# Patient Record
Sex: FEMALE | Race: BLACK OR AFRICAN AMERICAN
[De-identification: names, ages, dates, MRNs, and addresses within clinical notes are randomized per-mention and may not be internally consistent; named-entity substitution may affect disease eponyms.]

---

## 2017-10-25 ENCOUNTER — HOSPITAL ENCOUNTER (EMERGENCY)
Dept: HOSPITAL 62 - ER | Age: 37
Discharge: HOME | End: 2017-10-25
Payer: MEDICAID

## 2017-10-25 VITALS — SYSTOLIC BLOOD PRESSURE: 143 MMHG | DIASTOLIC BLOOD PRESSURE: 99 MMHG

## 2017-10-25 DIAGNOSIS — N30.00: Primary | ICD-10-CM

## 2017-10-25 DIAGNOSIS — R10.9: ICD-10-CM

## 2017-10-25 DIAGNOSIS — R42: ICD-10-CM

## 2017-10-25 DIAGNOSIS — R11.0: ICD-10-CM

## 2017-10-25 DIAGNOSIS — I10: ICD-10-CM

## 2017-10-25 DIAGNOSIS — R51: ICD-10-CM

## 2017-10-25 DIAGNOSIS — F17.210: ICD-10-CM

## 2017-10-25 LAB
ALBUMIN SERPL-MCNC: 3.5 G/DL (ref 3.5–5)
ALP SERPL-CCNC: 117 U/L (ref 38–126)
ALT SERPL-CCNC: 21 U/L (ref 9–52)
ANION GAP SERPL CALC-SCNC: 11 MMOL/L (ref 5–19)
APPEARANCE UR: (no result)
AST SERPL-CCNC: 16 U/L (ref 14–36)
BASOPHILS # BLD AUTO: 0.2 10^3/UL (ref 0–0.2)
BASOPHILS NFR BLD AUTO: 1.8 % (ref 0–2)
BILIRUB DIRECT SERPL-MCNC: 0.3 MG/DL (ref 0–0.4)
BILIRUB SERPL-MCNC: 0.5 MG/DL (ref 0.2–1.3)
BILIRUB UR QL STRIP: NEGATIVE
BUN SERPL-MCNC: 12 MG/DL (ref 7–20)
CALCIUM: 9 MG/DL (ref 8.4–10.2)
CHLORIDE SERPL-SCNC: 105 MMOL/L (ref 98–107)
CO2 SERPL-SCNC: 27 MMOL/L (ref 22–30)
CREAT SERPL-MCNC: 1 MG/DL (ref 0.52–1.25)
EOSINOPHIL # BLD AUTO: 0.4 10^3/UL (ref 0–0.6)
EOSINOPHIL NFR BLD AUTO: 4.1 % (ref 0–6)
ERYTHROCYTE [DISTWIDTH] IN BLOOD BY AUTOMATED COUNT: 13.8 % (ref 11.5–14)
GLUCOSE SERPL-MCNC: 95 MG/DL (ref 75–110)
GLUCOSE UR STRIP-MCNC: NEGATIVE MG/DL
HCT VFR BLD CALC: 34.3 % (ref 36–47)
HGB BLD-MCNC: 12 G/DL (ref 12–15.5)
HGB HCT DIFFERENCE: 1.7
KETONES UR STRIP-MCNC: NEGATIVE MG/DL
LIPASE SERPL-CCNC: 125.6 U/L (ref 23–300)
LYMPHOCYTES # BLD AUTO: 2.3 10^3/UL (ref 0.5–4.7)
LYMPHOCYTES NFR BLD AUTO: 26.4 % (ref 13–45)
MCH RBC QN AUTO: 29.4 PG (ref 27–33.4)
MCHC RBC AUTO-ENTMCNC: 34.9 G/DL (ref 32–36)
MCV RBC AUTO: 84 FL (ref 80–97)
MONOCYTES # BLD AUTO: 0.5 10^3/UL (ref 0.1–1.4)
MONOCYTES NFR BLD AUTO: 6 % (ref 3–13)
NEUTROPHILS # BLD AUTO: 5.3 10^3/UL (ref 1.7–8.2)
NEUTS SEG NFR BLD AUTO: 61.7 % (ref 42–78)
NITRITE UR QL STRIP: POSITIVE
PH UR STRIP: 6 [PH] (ref 5–9)
POTASSIUM SERPL-SCNC: 3.9 MMOL/L (ref 3.6–5)
PROT SERPL-MCNC: 6.6 G/DL (ref 6.3–8.2)
PROT UR STRIP-MCNC: NEGATIVE MG/DL
RBC # BLD AUTO: 4.07 10^6/UL (ref 3.72–5.28)
SODIUM SERPL-SCNC: 142.6 MMOL/L (ref 137–145)
SP GR UR STRIP: 1.01
UROBILINOGEN UR-MCNC: NEGATIVE MG/DL (ref ?–2)
WBC # BLD AUTO: 8.6 10^3/UL (ref 4–10.5)

## 2017-10-25 PROCEDURE — 87086 URINE CULTURE/COLONY COUNT: CPT

## 2017-10-25 PROCEDURE — 96372 THER/PROPH/DIAG INJ SC/IM: CPT

## 2017-10-25 PROCEDURE — 80053 COMPREHEN METABOLIC PANEL: CPT

## 2017-10-25 PROCEDURE — 36415 COLL VENOUS BLD VENIPUNCTURE: CPT

## 2017-10-25 PROCEDURE — 87088 URINE BACTERIA CULTURE: CPT

## 2017-10-25 PROCEDURE — 83690 ASSAY OF LIPASE: CPT

## 2017-10-25 PROCEDURE — 85025 COMPLETE CBC W/AUTO DIFF WBC: CPT

## 2017-10-25 PROCEDURE — 99284 EMERGENCY DEPT VISIT MOD MDM: CPT

## 2017-10-25 PROCEDURE — 81001 URINALYSIS AUTO W/SCOPE: CPT

## 2017-10-25 PROCEDURE — 87186 SC STD MICRODIL/AGAR DIL: CPT

## 2017-10-25 PROCEDURE — S0119 ONDANSETRON 4 MG: HCPCS

## 2017-10-25 PROCEDURE — 81025 URINE PREGNANCY TEST: CPT

## 2017-10-25 NOTE — ER DOCUMENT REPORT
ED General





- General


Mode of Arrival: Ambulatory


Information source: Patient


TRAVEL OUTSIDE OF THE [place] IN LAST 30 DAYS: No





<GURPREET WADE - Last Filed: 10/25/17 02:32>





<BRENNON HIGGINBOTHAM - Last Filed: 10/25/17 03:47>





- General


Chief Complaint: Abdominal Pain


Stated Complaint: HEADACHE


Time Seen by Provider: 10/25/17 01:26


Notes: 





Patient is a 37 year old female that presents to the emergency department today 

with complaints of abdominal pain with associated lightheadedness, headache, 

nausea, and lower back pain. Patient states that she was cooking dinner when 

these symptoms began. Patient states she went and laid down. Patient states she 

has had similar abdominal pain in the past when she had a urinary tract 

infection. Patient states her headache relieved somewhat after ibuprofen. 

Patient denies vomiting, history of migraine headaches, or dysuria.  (GURPREET WADE)





Past Medical History





- General


Information source: Patient





- Social History


Smoking Status: Current Every Day Smoker


Cigarette use (# per day): Yes


Frequency of alcohol use: None


Drug Abuse: None


Lives with: Family


Family History: Reviewed & Not Pertinent


Patient has suicidal ideation: No


Patient has homicidal ideation: No





- Past Medical History


Cardiac Medical History: Reports: Hx Hypertension


Past Surgical History: Reports: Hx Cholecystectomy





<GURPREET WADE - Last Filed: 10/25/17 02:32>





Review of Systems





- Review of Systems


Constitutional: No symptoms reported


EENT: No symptoms reported


Cardiovascular: See HPI, Lightheaded


Respiratory: No symptoms reported


Gastrointestinal: See HPI, Abdominal pain, Nausea.  denies: Vomiting


Genitourinary: denies: Dysuria


Female Genitourinary: No symptoms reported


Musculoskeletal: See HPI, Back pain


Skin: No symptoms reported


Hematologic/Lymphatic: No symptoms reported


Neurological/Psychological: See HPI, Headaches


-: Yes All other systems reviewed and negative





<GURPREET WADE - Last Filed: 10/25/17 02:32>





Physical Exam





<GURPREET WADE - Last Filed: 10/25/17 02:32>





<BRENNON HIGGINBOTHAM - Last Filed: 10/25/17 03:47>





- Vital signs


Vitals: 





 











Temp Pulse Resp BP Pulse Ox


 


 98.7 F   98   18   161/100 H  100 


 


 10/25/17 00:26  10/25/17 00:26  10/25/17 00:26  10/25/17 00:26  10/25/17 00:26














- Notes


Notes: 





PHYSICAL EXAM


 


GENERAL: Obese. Alert, interacts well. No acute distress.


HEAD: Normocephalic, atraumatic.


EYES: Pupils equal, round, and reactive to light. Extraocular movements intact.


ENT: Oral mucosa moist, tongue midline. 


NECK: Full range of motion. Supple. Trachea midline.


LUNGS: Clear to auscultation bilaterally, no wheezes, rales, or rhonchi. No 

respiratory distress.


HEART: Regular rate and rhythm. No murmurs, gallops, or rubs.


ABDOMEN: Epigastric tenderness with palpation, no guarding, rebound, or 

rigidity. Non-distended. Bowel sounds present in all 4 quadrants.


EXTREMITIES: Moves all 4 extremities spontaneously. No edema, radial and 

dorsalis pedis pulses 2/4 bilaterally. No cyanosis.


NEUROLOGICAL: Alert and oriented x3. Normal speech. 


PSYCH: Normal affect, normal mood.


SKIN: Warm, dry, normal turgor. No rashes or lesions noted.


 (GURPREET WADE)





Course





- Laboratory


Result Diagrams: 


 10/25/17 01:40





 10/25/17 01:40





<GURPREET WADE - Last Filed: 10/25/17 02:32>





- Laboratory


Result Diagrams: 


 10/25/17 01:40





 10/25/17 01:40





<BRENNON HIGGINBOTHAM - Last Filed: 10/25/17 03:47>





- Re-evaluation


Re-evalutation: 





10/25/17 03:45


CBC unremarkable, CMP unremarkable, lipase normal, pregnancy test negative, 

urinalysis shows positive nitrates and trace leukocyte esterase, 3+ bacteria, 

this was sent for culture, headache was treated and completely resolved with 

Toradol, this also relieved her epigastric abdominal pain, patient was also 

treated with Macrobid and Pyridium, all of her symptoms are gone, will be 

discharged home on ibuprofen, Macrobid and Pyridium. (BRENNON HIGGINBOTHAM)





- Vital Signs


Vital signs: 





 











Temp Pulse Resp BP Pulse Ox


 


 98.7 F   98   18   163/112 H  100 


 


 10/25/17 00:26  10/25/17 00:26  10/25/17 00:26  10/25/17 02:31  10/25/17 00:26














- Laboratory


Laboratory results interpreted by me: 





 











  10/25/17 10/25/17





  01:40 01:45


 


Hct  34.3 L 


 


Urine Nitrite   POSITIVE H


 


Ur Leukocyte Esterase   TRACE H














Discharge





<GURPREET WADE - Last Filed: 10/25/17 02:32>





<BRENNON HIGGINBOTHAM - Last Filed: 10/25/17 03:47>





- Discharge


Clinical Impression: 


 Epigastric abdominal pain





Urinary tract infection


Qualifiers:


 Urinary tract infection type: acute cystitis Hematuria presence: without 

hematuria Qualified Code(s): N30.00 - Acute cystitis without hematuria





Hypertension


Qualifiers:


 Hypertension type: essential hypertension Qualified Code(s): I10 - Essential (

primary) hypertension





Headache


Qualifiers:


 Headache type: unspecified Headache chronicity pattern: unspecified pattern 

Intractability: not intractable Qualified Code(s): R51 - Headache





Condition: Stable


Disposition: HOME, SELF-CARE


Additional Instructions: 


Please use ibuprofen (Motrin or Advil) 600-800 mg every 8 hours as needed for 

pain or fever.  You may also use acetaminophen (Tylenol) 1000 mg every 4-6 

hours as needed for pain or fever.  Please be aware that many medications 

contain acetaminophen, do not exceed a total of 1000 mg of acetaminophen every 

6 hours.  





Please take the Pyridium as directed for pain.  Take the Macrobid as directed 

until it is gone, this is an antibiotic.


Prescriptions: 


Nitrofurantoin/Nitrofuran Mac [Macrobid 100 mg Capsule] 1 tab PO BID #14 capsule


Phenazopyridine HCl [Pyridium 200 mg Tablet] 200 mg PO TID #7 tablet


Forms:  Parent Work Note


Scribe Attestation: 





10/25/17 03:47


I personally performed the services described in the documentation, reviewed 

and edited the documentation which was dictated to the scribe in my presence, 

and it accurately records my words and actions. (BRENNON HIGGINBOTHAM)





Scribe Documentation





- Scribe


Written by Jenise:: Jenise Beasley, 10/25/2017 0240


acting as scribe for :: Coty





<GURPREET WADE - Last Filed: 10/25/17 02:32>

## 2018-05-09 ENCOUNTER — HOSPITAL ENCOUNTER (EMERGENCY)
Dept: HOSPITAL 62 - ER | Age: 38
Discharge: HOME | End: 2018-05-09
Payer: MEDICAID

## 2018-05-09 VITALS — DIASTOLIC BLOOD PRESSURE: 104 MMHG | SYSTOLIC BLOOD PRESSURE: 148 MMHG

## 2018-05-09 DIAGNOSIS — Z90.49: ICD-10-CM

## 2018-05-09 DIAGNOSIS — I10: ICD-10-CM

## 2018-05-09 DIAGNOSIS — N30.00: Primary | ICD-10-CM

## 2018-05-09 LAB
ADD MANUAL DIFF: NO
ALBUMIN SERPL-MCNC: 3.8 G/DL (ref 3.5–5)
ALP SERPL-CCNC: 102 U/L (ref 38–126)
ALT SERPL-CCNC: 22 U/L (ref 9–52)
ANION GAP SERPL CALC-SCNC: 10 MMOL/L (ref 5–19)
APPEARANCE UR: (no result)
APTT PPP: YELLOW S
AST SERPL-CCNC: 12 U/L (ref 14–36)
BASOPHILS # BLD AUTO: 0.1 10^3/UL (ref 0–0.2)
BASOPHILS NFR BLD AUTO: 1.3 % (ref 0–2)
BILIRUB DIRECT SERPL-MCNC: 0.3 MG/DL (ref 0–0.4)
BILIRUB SERPL-MCNC: 0.3 MG/DL (ref 0.2–1.3)
BILIRUB UR QL STRIP: NEGATIVE
BUN SERPL-MCNC: 8 MG/DL (ref 7–20)
CALCIUM: 9.4 MG/DL (ref 8.4–10.2)
CHLORIDE SERPL-SCNC: 102 MMOL/L (ref 98–107)
CO2 SERPL-SCNC: 27 MMOL/L (ref 22–30)
EOSINOPHIL # BLD AUTO: 0.3 10^3/UL (ref 0–0.6)
EOSINOPHIL NFR BLD AUTO: 2.9 % (ref 0–6)
ERYTHROCYTE [DISTWIDTH] IN BLOOD BY AUTOMATED COUNT: 13.6 % (ref 11.5–14)
GLUCOSE SERPL-MCNC: 83 MG/DL (ref 75–110)
GLUCOSE UR STRIP-MCNC: NEGATIVE MG/DL
HCT VFR BLD CALC: 36 % (ref 36–47)
HGB BLD-MCNC: 12.1 G/DL (ref 12–15.5)
KETONES UR STRIP-MCNC: NEGATIVE MG/DL
LIPASE SERPL-CCNC: 82.6 U/L (ref 23–300)
LYMPHOCYTES # BLD AUTO: 2.3 10^3/UL (ref 0.5–4.7)
LYMPHOCYTES NFR BLD AUTO: 25 % (ref 13–45)
MCH RBC QN AUTO: 29.2 PG (ref 27–33.4)
MCHC RBC AUTO-ENTMCNC: 33.6 G/DL (ref 32–36)
MCV RBC AUTO: 87 FL (ref 80–97)
MONOCYTES # BLD AUTO: 0.4 10^3/UL (ref 0.1–1.4)
MONOCYTES NFR BLD AUTO: 4.8 % (ref 3–13)
NEUTROPHILS # BLD AUTO: 6.2 10^3/UL (ref 1.7–8.2)
NEUTS SEG NFR BLD AUTO: 66 % (ref 42–78)
NITRITE UR QL STRIP: POSITIVE
PH UR STRIP: 5 [PH] (ref 5–9)
PLATELET # BLD: 363 10^3/UL (ref 150–450)
POTASSIUM SERPL-SCNC: 3.7 MMOL/L (ref 3.6–5)
PROT SERPL-MCNC: 7.1 G/DL (ref 6.3–8.2)
PROT UR STRIP-MCNC: NEGATIVE MG/DL
RBC # BLD AUTO: 4.14 10^6/UL (ref 3.72–5.28)
SODIUM SERPL-SCNC: 139.3 MMOL/L (ref 137–145)
SP GR UR STRIP: 1.01
TOTAL CELLS COUNTED % (AUTO): 100 %
UROBILINOGEN UR-MCNC: NEGATIVE MG/DL (ref ?–2)
WBC # BLD AUTO: 9.4 10^3/UL (ref 4–10.5)

## 2018-05-09 PROCEDURE — S0119 ONDANSETRON 4 MG: HCPCS

## 2018-05-09 PROCEDURE — 87086 URINE CULTURE/COLONY COUNT: CPT

## 2018-05-09 PROCEDURE — 87088 URINE BACTERIA CULTURE: CPT

## 2018-05-09 PROCEDURE — 84702 CHORIONIC GONADOTROPIN TEST: CPT

## 2018-05-09 PROCEDURE — 83690 ASSAY OF LIPASE: CPT

## 2018-05-09 PROCEDURE — 36415 COLL VENOUS BLD VENIPUNCTURE: CPT

## 2018-05-09 PROCEDURE — 81001 URINALYSIS AUTO W/SCOPE: CPT

## 2018-05-09 PROCEDURE — 87186 SC STD MICRODIL/AGAR DIL: CPT

## 2018-05-09 PROCEDURE — 99284 EMERGENCY DEPT VISIT MOD MDM: CPT

## 2018-05-09 PROCEDURE — 85025 COMPLETE CBC W/AUTO DIFF WBC: CPT

## 2018-05-09 PROCEDURE — 80053 COMPREHEN METABOLIC PANEL: CPT

## 2018-05-09 NOTE — ER DOCUMENT REPORT
ED Medical Screen (RME)





- General


Chief Complaint: Abdominal Pain


Stated Complaint: ABDOMINAL PAIN,NAUSEA,SINUS PAIN


Time Seen by Provider: 05/09/18 19:21


TRAVEL OUTSIDE OF THE [place] IN LAST 30 DAYS: No





- HPI


Notes: 





05/09/18 19:28


Abdominal pain with nausea sinus pain





Past Medical History





- Social History


Chew tobacco use (# tins/day): No


Frequency of alcohol use: None


Drug Abuse: None





- Past Medical History


Cardiac Medical History: Reports: Hx Hypertension


Renal/ Medical History: Denies: Hx Peritoneal Dialysis


Past Surgical History: Reports: Hx Cholecystectomy





Review of Systems





- Review of Systems


Gastrointestinal: Abdominal pain





Physical Exam





- Vital signs


Vitals: 





 











Temp Pulse Resp BP Pulse Ox


 


 98.2 F   91   16   156/115 H  99 


 


 05/09/18 18:52  05/09/18 18:52  05/09/18 18:52  05/09/18 18:52  05/09/18 18:52














- General


General appearance: Appears well


In distress: None





- Abdominal


Inspection: Obese





Course





- Vital Signs


Vital signs: 





 











Temp Pulse Resp BP Pulse Ox


 


 98.2 F   91   16   156/115 H  99 


 


 05/09/18 18:52  05/09/18 18:52  05/09/18 18:52  05/09/18 18:52  05/09/18 18:52

## 2018-05-09 NOTE — ER DOCUMENT REPORT
ED GI/





- General


Chief Complaint: Abdominal Pain


Stated Complaint: ABDOMINAL PAIN,NAUSEA,SINUS PAIN


Time Seen by Provider: 05/09/18 19:21


Mode of Arrival: Ambulatory


Information source: Patient


TRAVEL OUTSIDE OF THE [place] IN LAST 30 DAYS: No





Past Medical History





- Social History


Smoking Status: Current Every Day Smoker


Chew tobacco use (# tins/day): No


Frequency of alcohol use: None


Drug Abuse: None


Family History: Reviewed & Not Pertinent


Patient has suicidal ideation: No


Patient has homicidal ideation: No





- Past Medical History


Cardiac Medical History: Reports: Hx Hypertension


Renal/ Medical History: Denies: Hx Peritoneal Dialysis


Past Surgical History: Reports: Hx Cholecystectomy





Physical Exam





- Vital signs


Vitals: 





 











Temp Pulse Resp BP Pulse Ox


 


 98.2 F   91   16   156/115 H  99 


 


 05/09/18 18:52  05/09/18 18:52  05/09/18 18:52  05/09/18 18:52  05/09/18 18:52














Course





- Vital Signs


Vital signs: 





 











Temp Pulse Resp BP Pulse Ox


 


 98.2 F   91   16   156/115 H  99 


 


 05/09/18 18:52  05/09/18 18:52  05/09/18 18:52  05/09/18 18:52  05/09/18 18:52














- Laboratory


Result Diagrams: 


 05/09/18 20:15





 05/09/18 20:15


Laboratory results interpreted by me: 





 











  05/09/18 05/09/18





  20:15 20:15


 


AST  12 L 


 


Urine Blood   SMALL H


 


Urine Nitrite   POSITIVE H


 


Ur Leukocyte Esterase   LARGE H














Discharge





- Discharge


Clinical Impression: 


 Essential hypertension





Urinary tract infection


Qualifiers:


 Urinary tract infection type: acute cystitis Hematuria presence: without 

hematuria Qualified Code(s): N30.00 - Acute cystitis without hematuria





Condition: Stable


Disposition: HOME, SELF-CARE


Instructions:  Abdominal Pain ([facility]), Trimethoprim-Sulfa ([facility]), Urinary Tract 

Infection ([facility]), Antinausea Medication ([facility])


Additional Instructions: 


REST, DRINK PLENTY OF FLUIDS.


TAKE YOUR ANTIBIOTIC AS DIRECTED, BEGINNING TOMORROW A.M.


CONTINUE YOUR OTHER MEDS AS USUAL.


FOLLOW UP WITH YOUR PRIMARY CARE PROVIDER FOR RE-CHECK OF BLOOD PRESSURE, YOU 

MAY NEED TO HAVE YOUR MEDS ADJUSTED.


RETURN TO E.R. IF PROBLEMS.


Prescriptions: 


Sulfamethoxazole/Trimethoprim [Sulfamethoxazole-Tmp Ds Tablet] 1 each PO BID #

20 tablet

## 2018-06-14 ENCOUNTER — HOSPITAL ENCOUNTER (EMERGENCY)
Dept: HOSPITAL 62 - ER | Age: 38
Discharge: HOME | End: 2018-06-14
Payer: MEDICAID

## 2018-06-14 VITALS — DIASTOLIC BLOOD PRESSURE: 115 MMHG | SYSTOLIC BLOOD PRESSURE: 155 MMHG

## 2018-06-14 DIAGNOSIS — I10: ICD-10-CM

## 2018-06-14 DIAGNOSIS — J01.00: Primary | ICD-10-CM

## 2018-06-14 DIAGNOSIS — Z90.49: ICD-10-CM

## 2018-06-14 PROCEDURE — 99283 EMERGENCY DEPT VISIT LOW MDM: CPT

## 2018-06-14 NOTE — ER DOCUMENT REPORT
HPI





- HPI


Pain Level: 3


Notes: 





Patient is a 37-year-old female with a history of hypertension who presents to 

the ED complaining of nasal congestion/discharge, sinus pressure and pain, 

postnasal drip, left ear "clogged" 1 week.  Patient states that on occasion 

she will have a sinus headache, but currently does not have a headache.  

Patient states that she did not take her evening dose of her blood pressure 

medication, lisinopril.  Patient states that when she gets the symptoms usually 

goes to her primary care doctor and gets an antibiotic which is what she wants 

today.  She denies any drug allergies.  No other concerns or complaints at this 

time.  Denies any headache, fever, neck pain, sore throat, chest pain, 

palpitations, syncope, cough, shortness of breath, wheeze, dyspnea, abdominal 

pain, nausea/vomiting/diarrhea, urinary retention, dysuria, hematuria, or rash.





- ROS


Systems Reviewed and Negative: Yes All other systems reviewed and negative





Past Medical History





- Social History


Smoking Status: Never Smoker


Family History: Reviewed & Not Pertinent





- Past Medical History


Cardiac Medical History: Reports: Hx Hypertension


Renal/ Medical History: Denies: Hx Peritoneal Dialysis


Past Surgical History: Reports: Hx Cholecystectomy





Vertical Provider Document





- CONSTITUTIONAL


Agree With Documented VS: Yes


Notes: 





PHYSICAL EXAMINATION:





GENERAL: Well-appearing, well-nourished and in no acute distress.  A&Ox4.  

Answers questions appropriately.  Moves comfortably w/o notable distress





HEAD: Atraumatic, normocephalic.





EYES: Pupils equal round and reactive to light, extraocular movements intact, 

sclera anicteric, conjunctiva are normal.





ENT: EAC clear b/l.  TM's intact b/l without erythema, fluid, or perforation.  

Nares patent and with clear discharge.  oropharynx no erythema without 

exudates.  No tonsilar hypertrophy without erythema or exudate.  No palatine 

shift.  Uvula midline.  No tongue protrusion.  No drooling, hoarseness, or 

airway compromise.  Moist mucous membranes.  + maxillary sinus tenderness.





NECK: Normal range of motion, supple without lymphadenopathy.  No rigidity/

meningismus.





LUNGS: Breath sounds clear to auscultation bilaterally and equal.  No wheezes 

rales or rhonchi.  No retractions





HEART: Regular rate and rhythm without murmurs, rubs, gallops.





NEUROLOGICAL: Cranial nerves 2-12 intact.  Normal speech, normal gait.  Normal 

sensory, motor exams 





PSYCH: Normal mood, normal affect.





SKIN: Warm, Dry, normal turgor, no rashes or lesions noted.





- INFECTION CONTROL


TRAVEL OUTSIDE OF THE [place] IN LAST 30 DAYS: No





Course





- Re-evaluation


Re-evalutation: 





06/14/18 23:28


Patient is an afebrile, well-hydrated, 37-year-old female who presents to the 

ED with an acute sinusitis, suspect viral.  Vitals are acceptable.  PE is 

otherwise unremarkable for any focal neurological deficits.  Patient has no 

significant tachycardia, tachypnea, or hypoxia.  She is tolerating p.o. without 

any difficulties and is nontoxic-appearing.  No labs or imaging warranted at 

this time based on H&P.  I did offer to give patient her evening dose of her 

lisinopril, but patient declined and states that she will take it when she gets 

home.  Advised patient that I do believe this illness to be viral, but patient 

is being very persistent and demanding about her antibiotic.  Advised patient 

that I will only send her home with a "pocket prescription" that she may fill 

with ongoing/worsening symptoms 2-3 days.  Conservative measures otherwise for 

symptoms.  Low suspicion for any sepsis, meningitis, or other acute systemic 

emergent condition at this time.  Recheck with your PCM in 2-3 days.  Return to 

the ED with any worsening/concerning symptoms otherwise as reviewed discharge.  

Patient is in agreement.








- Vital Signs


Vital signs: 


 











Temp Pulse Resp BP Pulse Ox


 


 98.1 F   92   20   156/114 H  99 


 


 06/14/18 21:53  06/14/18 21:53  06/14/18 21:53  06/14/18 21:53  06/14/18 21:53














Discharge





- Discharge


Clinical Impression: 


Acute sinusitis


Qualifiers:


 Sinusitis location: maxillary Recurrence: not specified as recurrent Qualified 

Code(s): J01.00 - Acute maxillary sinusitis, unspecified





Condition: Stable


Disposition: HOME, SELF-CARE


Instructions:  Headache ([facility]), Sinusitis ([facility])


Additional Instructions: 


Maintain adequate fluid and food intake


Take home medications as directed


Over-the-counter meds as needed for cold symptoms


Low sodium/fat diet


Exercise regularly


Monitor blood pressure daily and keep a log


Monitor symptoms for any acute changes


Recheck with your PCM in 2-3 days


Return to the ED with any worsening symptoms and/or development of fever, 

headache, chest pain, palpitations, syncope, shortness of breath, trouble 

breathing, abdominal pain, n/v/d, blood in stool/urine, loss of control of bowel

/bladder, urinary retention, muscle weakness/paralysis, numbness/tingling, or 

other worsening symptoms that are concerning to you.


Prescriptions: 


Amox Tr/Potassium Clavulanate [Augmentin 875-125 Tablet] 1 tab PO BID 10 Days #

20 tablet


Forms:  Elevated Blood Pressure


Referrals: 


ALMA GUERRERO DO [ASSOCIATE] - Follow up as needed

## 2018-09-27 ENCOUNTER — HOSPITAL ENCOUNTER (EMERGENCY)
Dept: HOSPITAL 62 - ER | Age: 38
Discharge: HOME | End: 2018-09-27
Payer: COMMERCIAL

## 2018-09-27 VITALS — DIASTOLIC BLOOD PRESSURE: 109 MMHG | SYSTOLIC BLOOD PRESSURE: 154 MMHG

## 2018-09-27 DIAGNOSIS — I10: ICD-10-CM

## 2018-09-27 DIAGNOSIS — R63.0: ICD-10-CM

## 2018-09-27 DIAGNOSIS — M79.7: ICD-10-CM

## 2018-09-27 DIAGNOSIS — F41.9: Primary | ICD-10-CM

## 2018-09-27 DIAGNOSIS — R00.0: ICD-10-CM

## 2018-09-27 DIAGNOSIS — F17.210: ICD-10-CM

## 2018-09-27 DIAGNOSIS — R05: ICD-10-CM

## 2018-09-27 LAB
ADD MANUAL DIFF: NO
ALBUMIN SERPL-MCNC: 4.1 G/DL (ref 3.5–5)
ALP SERPL-CCNC: 88 U/L (ref 38–126)
ALT SERPL-CCNC: 20 U/L (ref 9–52)
ANION GAP SERPL CALC-SCNC: 9 MMOL/L (ref 5–19)
APPEARANCE UR: (no result)
APTT PPP: YELLOW S
AST SERPL-CCNC: 20 U/L (ref 14–36)
BASOPHILS # BLD AUTO: 0.1 10^3/UL (ref 0–0.2)
BASOPHILS NFR BLD AUTO: 1 % (ref 0–2)
BILIRUB DIRECT SERPL-MCNC: 0.6 MG/DL (ref 0–0.4)
BILIRUB SERPL-MCNC: 0.7 MG/DL (ref 0.2–1.3)
BILIRUB UR QL STRIP: NEGATIVE
BUN SERPL-MCNC: 12 MG/DL (ref 7–20)
CALCIUM: 9.9 MG/DL (ref 8.4–10.2)
CHLORIDE SERPL-SCNC: 107 MMOL/L (ref 98–107)
CK MB SERPL-MCNC: < 0.22 NG/ML (ref ?–4.55)
CK SERPL-CCNC: 60 U/L (ref 30–135)
CO2 SERPL-SCNC: 24 MMOL/L (ref 22–30)
EOSINOPHIL # BLD AUTO: 0.1 10^3/UL (ref 0–0.6)
EOSINOPHIL NFR BLD AUTO: 1.8 % (ref 0–6)
ERYTHROCYTE [DISTWIDTH] IN BLOOD BY AUTOMATED COUNT: 14.4 % (ref 11.5–14)
GLUCOSE SERPL-MCNC: 97 MG/DL (ref 75–110)
GLUCOSE UR STRIP-MCNC: NEGATIVE MG/DL
HCT VFR BLD CALC: 37 % (ref 36–47)
HGB BLD-MCNC: 12.6 G/DL (ref 12–15.5)
KETONES UR STRIP-MCNC: (no result) MG/DL
LYMPHOCYTES # BLD AUTO: 1.7 10^3/UL (ref 0.5–4.7)
LYMPHOCYTES NFR BLD AUTO: 21.1 % (ref 13–45)
MCH RBC QN AUTO: 29.4 PG (ref 27–33.4)
MCHC RBC AUTO-ENTMCNC: 34.2 G/DL (ref 32–36)
MCV RBC AUTO: 86 FL (ref 80–97)
MONOCYTES # BLD AUTO: 0.5 10^3/UL (ref 0.1–1.4)
MONOCYTES NFR BLD AUTO: 6.5 % (ref 3–13)
NEUTROPHILS # BLD AUTO: 5.6 10^3/UL (ref 1.7–8.2)
NEUTS SEG NFR BLD AUTO: 69.6 % (ref 42–78)
NITRITE UR QL STRIP: NEGATIVE
PH UR STRIP: 5 [PH] (ref 5–9)
PLATELET # BLD: 382 10^3/UL (ref 150–450)
POTASSIUM SERPL-SCNC: 4.4 MMOL/L (ref 3.6–5)
PROT SERPL-MCNC: 7.5 G/DL (ref 6.3–8.2)
PROT UR STRIP-MCNC: NEGATIVE MG/DL
RBC # BLD AUTO: 4.3 10^6/UL (ref 3.72–5.28)
SODIUM SERPL-SCNC: 140.1 MMOL/L (ref 137–145)
SP GR UR STRIP: 1.01
TOTAL CELLS COUNTED % (AUTO): 100 %
TROPONIN I SERPL-MCNC: < 0.012 NG/ML
UROBILINOGEN UR-MCNC: NEGATIVE MG/DL (ref ?–2)
WBC # BLD AUTO: 8 10^3/UL (ref 4–10.5)

## 2018-09-27 PROCEDURE — 99285 EMERGENCY DEPT VISIT HI MDM: CPT

## 2018-09-27 PROCEDURE — 93005 ELECTROCARDIOGRAM TRACING: CPT

## 2018-09-27 PROCEDURE — 82550 ASSAY OF CK (CPK): CPT

## 2018-09-27 PROCEDURE — 81025 URINE PREGNANCY TEST: CPT

## 2018-09-27 PROCEDURE — 82553 CREATINE MB FRACTION: CPT

## 2018-09-27 PROCEDURE — 80053 COMPREHEN METABOLIC PANEL: CPT

## 2018-09-27 PROCEDURE — 85025 COMPLETE CBC W/AUTO DIFF WBC: CPT

## 2018-09-27 PROCEDURE — 96361 HYDRATE IV INFUSION ADD-ON: CPT

## 2018-09-27 PROCEDURE — 71045 X-RAY EXAM CHEST 1 VIEW: CPT

## 2018-09-27 PROCEDURE — 84484 ASSAY OF TROPONIN QUANT: CPT

## 2018-09-27 PROCEDURE — 93010 ELECTROCARDIOGRAM REPORT: CPT

## 2018-09-27 PROCEDURE — 81001 URINALYSIS AUTO W/SCOPE: CPT

## 2018-09-27 PROCEDURE — 36415 COLL VENOUS BLD VENIPUNCTURE: CPT

## 2018-09-27 PROCEDURE — 96374 THER/PROPH/DIAG INJ IV PUSH: CPT

## 2018-09-27 NOTE — ER DOCUMENT REPORT
ED General





- General


Chief Complaint: Chest Pain


Stated Complaint: PAIN ALL OVER


Time Seen by Provider: 18 14:40


Notes: 





Patient is a 37-year-old female that presents to the emergency department for 

chief complaint of anxiety and pain all over.  Patient states she has been 

complaining of pain over her whole body that started earlier today, states she 

has had back pain, chest pain, abdominal pain, pain in her arms and legs, cough 

and congestion.  She is also had decreased appetite over the last several days.

  Denies any worsening of her symptoms with exertion.  Denies vomiting or 

diaphoresis.  She does have fibromyalgia, which occasionally can feel like this 

but that seems to be a little bit worse today.  She also states she has been 

feeling much more anxious recently and recently she did see the mental health 

clinic yesterday, and does have an appointment next week with a psychiatrist, 

and she has been feeling rather anxious about this whole process.  Denies any 

shortness of breath, dysuria, hematuria, and states that she is not pregnant.





Past Medical History: Fibromyalgia, anxiety, hypertension


Past Surgical History: Cholecystectomy, 


Social History: Admits to smoking cigarettes, denies alcohol or drug use


Family History: Reviewed and noncontributory for presenting illness


Allergies: Reviewed, see documented allergy list. 





REVIEW OF SYSTEMS:


Unless otherwise stated in this report the patient's positive and negative 

responses for review of systems for constitutional, eyes, ENT, cardiovascular, 

respiratory, gastrointestinal, neurological, genitourinary, musculoskeletal, 

and integumentary systems and related systems to the presenting problem are 

either as stated in the HPI or were not pertinent or were negative for the 

symptoms and/or complaints related to the presenting medical problem.





PHYSICAL EXAMINATION:





Vital signs reviewed, nursing noted reviewed. 





GENERAL: Well-appearing, well-nourished and in no acute distress.





HEAD: Atraumatic, normocephalic.





EYES: Eyes appear normal, extraocular movements intact, sclera anicteric, 

conjunctiva are normal.





ENT: nares patent, oropharynx clear without exudates.  Moist mucous membranes.





NECK: Normal range of motion, supple without lymphadenopathy





LUNGS: Breath sounds clear to auscultation bilaterally and equal.  No wheezes 

rales or rhonchi.  Reproducible chest wall tenderness





HEART: Regular rate and rhythm without murmurs





ABDOMEN: Soft, nontender, normoactive bowel sounds.  No rebound, guarding, or 

rigidity. No masses appreciated.





EXTREMITIES: Trigger point tenderness bilaterally in the upper and lower 

extremities, good range of motion, no pitting or edema.  





NEUROLOGICAL: No focal neurological deficits. Moves all extremities 

spontaneously Motor and sensory grossly intact on exam.





PSYCH: Normal mood, normal affect.





SKIN: Warm, Dry, normal turgor, no rashes or lesions noted on exposed skin





TRAVEL OUTSIDE OF THE [place] IN LAST 30 DAYS: No





- Related Data


Allergies/Adverse Reactions: 


 





prednisone Adverse Reaction (Verified 18 23:41)


 Shortness of Breath











Past Medical History





- Social History


Smoking Status: Current Every Day Smoker


Family History: Reviewed & Not Pertinent





- Past Medical History


Cardiac Medical History: Reports: Hx Hypertension


Renal/ Medical History: Denies: Hx Peritoneal Dialysis


Psychiatric Medical History: Reports: Hx Depression


Past Surgical History: Reports: Hx Cholecystectomy





Physical Exam





- Vital signs


Vitals: 


 











Temp Pulse Resp BP Pulse Ox


 


 98.2 F   95   16   154/109 H  100 


 


 18 14:09  18 14:09  18 14:09  18 14:09  18 14:09














Course





- Re-evaluation


Re-evalutation: 





Patient seen and examined vital signs reviewed. 





Laboratory data and imaging were ordered as appropriate for the patient's 

presenting symptoms and complaint, with consideration of any critical or life 

threatening conditions that may be associated with their obtained history and 

exam as noted above.





Patient was treated with IV fluids, Ativan, Zofran





Results were reviewed when available and demonstrated unremarkable blood work, 

negative chest x-ray, negative troponin





The patient was re-evaluated and was improved





Evaluation was most consistent with viral syndrome, versus fibromyalgia flare, 

and anxiety





Results were discussed with the patient at this point, after careful 

consideration I feel that that patient can be discharged from the emergency 

department, the patient was educated treatments and reasons to return to the 

emergency department based on their presumed diagnosis as noted above, they 

were advised to followup with a primary care physician in 2-3 days. Patient was 

agreeable to plan of care.





*Note is created using voice recognition software and may contain spelling, 

syntax or grammatical errors.








Laboratory











  18





  14:49 14:49 14:49


 


WBC  8.0  


 


RBC  4.30  


 


Hgb  12.6  


 


Hct  37.0  


 


MCV  86  


 


MCH  29.4  


 


MCHC  34.2  


 


RDW  14.4 H  


 


Plt Count  382  


 


Seg Neutrophils %  69.6  


 


Lymphocytes %  21.1  


 


Monocytes %  6.5  


 


Eosinophils %  1.8  


 


Basophils %  1.0  


 


Absolute Neutrophils  5.6  


 


Absolute Lymphocytes  1.7  


 


Absolute Monocytes  0.5  


 


Absolute Eosinophils  0.1  


 


Absolute Basophils  0.1  


 


Sodium   140.1 


 


Potassium   4.4 


 


Chloride   107 


 


Carbon Dioxide   24 


 


Anion Gap   9 


 


BUN   12 


 


Creatinine   1.05 


 


Est GFR ( Amer)   > 60 


 


Est GFR (Non-Af Amer)   58 L 


 


Glucose   97 


 


Calcium   9.9 


 


Total Bilirubin   0.7 


 


Direct Bilirubin   0.6 H 


 


Neonat Total Bilirubin   Not Reportable 


 


Neonat Direct Bilirubin   Not Reportable 


 


Neonat Indirect Bili   Not Reportable 


 


AST   20 


 


ALT   20 


 


Alkaline Phosphatase   88 


 


Creatine Kinase   60 


 


CK-MB (CK-2)    < 0.22


 


Troponin I    < 0.012


 


Total Protein   7.5 


 


Albumin   4.1 














 





Chest X-Ray  18 14:18


IMPRESSION:  NO ACUTE RADIOGRAPHIC FINDING IN THE CHEST.


 

















- Vital Signs


Vital signs: 


 











Temp Pulse Resp BP Pulse Ox


 


 98.2 F   95   14   154/109 H  98 


 


 18 14:09  18 14:09  18 14:41  18 14:09  18 14:41














- Laboratory


Result Diagrams: 


 18 14:49





 18 14:49


Laboratory results interpreted by me: 


 











  18





  14:49 14:49


 


RDW  14.4 H 


 


Est GFR (Non-Af Amer)   58 L


 


Direct Bilirubin   0.6 H














- EKG Interpretation by Me


Additional EKG results interpreted by me: 





EKG demonstrates sinus tachycardia with a ventricular rate of 106 bpm, normal 

axis, normal intervals, no ST changes noted, no prior EKG available for 

comparison.





Discharge





- Discharge


Clinical Impression: 


 Myalgia, Anxiety





Condition: Stable


Disposition: HOME, SELF-CARE


Instructions:  Anxiety (Critical access hospital)


Additional Instructions: 


Please return to the emergency department if you have any worsening, or concern 

of your symptoms.





Please return to the emergency department if you develop chest pain, difficulty 

breathing, severe abdominal pain, or ongoing vomiting.





Please follow-up with your primary care physician in 2-3 days and any other 

recommended physicians.





If prescribed, take all medications as directed.  





If you have any questions or concerns do not hesitate to return the emergency 

department for evaluation. 


Referrals: 


JOEY VAUGHAN MD [ACTIVE STAFF] - Follow up as needed (or your primary care. )

## 2018-09-27 NOTE — RADIOLOGY REPORT (SQ)
EXAM DESCRIPTION:  CHEST SINGLE VIEW



COMPLETED DATE/TIME:  9/27/2018 3:04 pm



REASON FOR STUDY:  cp



COMPARISON:  None.



EXAM PARAMETERS:  NUMBER OF VIEWS: One view.

TECHNIQUE: Single frontal radiographic view of the chest acquired.

RADIATION DOSE: NA

LIMITATIONS: None.



FINDINGS:  LUNGS AND PLEURA: No opacities, masses or pneumothorax. No pleural effusion.

MEDIASTINUM AND HILAR STRUCTURES: No masses.  Contour normal.

HEART AND VASCULAR STRUCTURES: Heart normal in size.  Normal vasculature.

BONES: No acute findings.

HARDWARE: None in the chest.

OTHER: No other significant finding.



IMPRESSION:  NO ACUTE RADIOGRAPHIC FINDING IN THE CHEST.



TECHNICAL DOCUMENTATION:  JOB ID:  [id number]

 2011 [employer]- All Rights Reserved



Reading location - IP/workstation name: [email or web address]

## 2018-09-27 NOTE — EKG REPORT
SEVERITY:- ABNORMAL ECG -

SINUS TACHYCARDIA

NONSPECIFIC T ABNORMALITIES, INFERIOR LEADS

:

Confirmed by: Jessica Gifford MD 27-Sep-2018 14:36:34

## 2018-09-28 ENCOUNTER — HOSPITAL ENCOUNTER (EMERGENCY)
Dept: HOSPITAL 62 - ER | Age: 38
Discharge: HOME | End: 2018-09-28
Payer: MEDICAID

## 2018-09-28 VITALS — DIASTOLIC BLOOD PRESSURE: 106 MMHG | SYSTOLIC BLOOD PRESSURE: 180 MMHG

## 2018-09-28 DIAGNOSIS — F17.200: ICD-10-CM

## 2018-09-28 DIAGNOSIS — R53.83: ICD-10-CM

## 2018-09-28 DIAGNOSIS — I10: ICD-10-CM

## 2018-09-28 DIAGNOSIS — F41.9: Primary | ICD-10-CM

## 2018-09-28 DIAGNOSIS — Z90.49: ICD-10-CM

## 2018-09-28 PROCEDURE — 99283 EMERGENCY DEPT VISIT LOW MDM: CPT

## 2018-09-28 NOTE — ER DOCUMENT REPORT
ED General





- General


Chief Complaint: Other


Stated Complaint: WEAKNESS/UNABLE TO EAT PAST 8 DAYS


Time Seen by Provider: 09/28/18 20:34


Mode of Arrival: Ambulatory


Information source: Patient


Notes: 





Chief complaint: Anxiety








History of complain:( obtained from----patient) 37 years old female presents 

today with anxiety,  elevated blood pressure.  No fever chills or other 

constitutional symptoms.  She has run out of her lorazepam.  Has an appointment 

on Tuesday for refill.








Onset: As above


Duration: Gradual


Severity: Mild to moderate


Quality: None


Context: As above


Exacerbating factor and relieving factors: As above











REVIEW OF SYSTEMS:


CONSTITUTIONAL :  Denies fever,  chills, or sweats.  Denies recent illness.


EENT:   Denies eye, ear, throat, or mouth pain or symptoms.  Denies nasal or 

sinus congestion or discharge.  Denies throat, tongue, or mouth swelling or 

difficulty swallowing.


CARDIOVASCULAR:  Denies chest pain.  Denies palpitations or racing or irregular 

heart beat.  Denies ankle edema.


RESPIRATORY:  Denies cough, cold, or chest congestion.  Denies shortness of 

breath, difficulty breathing, or wheezing.


GASTROINTESTINAL:  Denies  distention.  Denies nausea, vomiting, or diarrhea.  

Denies blood in vomitus, stools, or per rectum.  Denies black, tarry stools.  

Denies constipation. 


GENITOURINARY:  Denies difficulty urinating, painful urination, burning, 

frequency, blood in urine, or discharge.


FEMALE  GENITOURINARY:  Denies vaginal bleeding, heavy or abnormal periods, 

irregular periods.  Denies vaginal discharge or odor. 


MUSCULOSKELETAL:  Denies back or neck pain or stiffness.  Denies joint pain or 

swelling.


SKIN:   Denies rash, lesions or sores.


HEMATOLOGIC :   Denies easy bruising or bleeding.


LYMPHATIC:  Denies swollen, enlarged glands.


NEUROLOGICAL:  Denies confusion or altered mental status.  Denies passing out 

or loss of consciousness.  Denies dizziness or lightheadedness.  Denies 

headache.  Denies weakness or paralysis or loss of use of either side.  Denies 

problems with gait or speech.  Denies sensory loss, numbness, or tingling.  

Denies seizures.


PSYCHIATRIC:  Denies anxiety or stress.  Denies depression, suicidal ideation, 

or homicidal ideation.





ALL OTHER SYSTEMS REVIEWED AND NEGATIVE.











PHYSICAL EXAMINATION:





GENERAL: Well-appearing, well-nourished and in no acute distress.





HEAD: Atraumatic, normocephalic.





EYES: Pupils equal round and reactive to light, extraocular movements intact, 

conjunctiva are normal.





ENT: Nares patent, oropharynx clear without exudates.  Moist mucous membranes.





NECK: Normal range of motion, supple without lymphadenopathy





LUNGS: Breath sounds clear to auscultation bilaterally and equal.  No wheezes 

rales or rhonchi.





HEART: Regular rate and rhythm without murmurs





ABDOMEN: Soft, nontender, nondistended abdomen.  No guarding, no rebound.  No 

masses appreciated.





Examination of genitals-deferred





Musculoskeletal: Normal range of motion, no pitting or edema.  No cyanosis.





NEUROLOGICAL: Cranial nerves grossly intact.  Normal speech, normal gait.  

Normal sensory, motor exams





PSYCH: Normal mood, normal affect.





SKIN: Warm, Dry, normal turgor, no rashes or lesions noted.

















Dictation was performed using Dragon voice recognition software


TRAVEL OUTSIDE OF THE [place] IN LAST 30 DAYS: No





- HPI


Notes: 





Dictated





- Related Data


Allergies/Adverse Reactions: 


 





prednisone Adverse Reaction (Verified 06/14/18 23:41)


 Shortness of Breath











Past Medical History





- Social History


Smoking Status: Current Every Day Smoker


Chew tobacco use (# tins/day): No


Frequency of alcohol use: None


Drug Abuse: None


Lives with: Family


Family History: Reviewed & Not Pertinent


Patient has suicidal ideation: No


Patient has homicidal ideation: No





- Past Medical History


Cardiac Medical History: Reports: Hx Hypertension


Renal/ Medical History: Denies: Hx Peritoneal Dialysis


Psychiatric Medical History: Reports: Hx Depression


Past Surgical History: Reports: Hx Cholecystectomy





Review of Systems





- Review of Systems


Notes: 





Dictated





Physical Exam





- Vital signs


Vitals: 





 











Temp Pulse Resp BP Pulse Ox


 


 98.1 F   87   22 H  180/106 H  99 


 


 09/28/18 19:50  09/28/18 19:50  09/28/18 19:50  09/28/18 19:50  09/28/18 19:50














- Notes


Notes: 





Dictated





Course





- Re-evaluation


Re-evalutation: 





09/28/18 20:45


Given clonidine, as well as lorazepam.





- Vital Signs


Vital signs: 





 











Temp Pulse Resp BP Pulse Ox


 


 98.1 F   87   22 H  180/106 H  99 


 


 09/28/18 19:50  09/28/18 19:50  09/28/18 19:50  09/28/18 19:50  09/28/18 19:50














Discharge





- Discharge


Clinical Impression: 


 Anxiety





Hypertension


Qualifiers:


 Hypertension type: essential hypertension Qualified Code(s): I10 - Essential (

primary) hypertension





Condition: Fair


Disposition: HOME, SELF-CARE


Instructions:  Anxiety (OM), High Blood Pressure (OM)


Prescriptions: 


Lorazepam 1 mg PO BID #14 tablet

## 2018-11-02 ENCOUNTER — HOSPITAL ENCOUNTER (EMERGENCY)
Dept: HOSPITAL 62 - ER | Age: 38
Discharge: HOME | End: 2018-11-02
Payer: MEDICAID

## 2018-11-02 ENCOUNTER — HOSPITAL ENCOUNTER (EMERGENCY)
Dept: HOSPITAL 62 - ER | Age: 38
LOS: 1 days | Discharge: HOME | End: 2018-11-03
Payer: MEDICAID

## 2018-11-02 VITALS — SYSTOLIC BLOOD PRESSURE: 156 MMHG | DIASTOLIC BLOOD PRESSURE: 111 MMHG

## 2018-11-02 DIAGNOSIS — F17.200: ICD-10-CM

## 2018-11-02 DIAGNOSIS — I10: ICD-10-CM

## 2018-11-02 DIAGNOSIS — R05: ICD-10-CM

## 2018-11-02 DIAGNOSIS — J02.9: Primary | ICD-10-CM

## 2018-11-02 DIAGNOSIS — R13.10: ICD-10-CM

## 2018-11-02 PROCEDURE — 96372 THER/PROPH/DIAG INJ SC/IM: CPT

## 2018-11-02 PROCEDURE — 71046 X-RAY EXAM CHEST 2 VIEWS: CPT

## 2018-11-02 PROCEDURE — 87070 CULTURE OTHR SPECIMN AEROBIC: CPT

## 2018-11-02 PROCEDURE — 99282 EMERGENCY DEPT VISIT SF MDM: CPT

## 2018-11-02 PROCEDURE — 87880 STREP A ASSAY W/OPTIC: CPT

## 2018-11-02 PROCEDURE — 87077 CULTURE AEROBIC IDENTIFY: CPT

## 2018-11-02 PROCEDURE — 99283 EMERGENCY DEPT VISIT LOW MDM: CPT

## 2018-11-02 NOTE — RADIOLOGY REPORT (SQ)
EXAM DESCRIPTION:  CHEST 2 VIEWS



COMPLETED DATE/TIME:  11/2/2018 10:56 am



REASON FOR STUDY:  persistent cough



COMPARISON:  None.



EXAM PARAMETERS:  NUMBER OF VIEWS: two views

TECHNIQUE: Digital Frontal and Lateral radiographic views of the chest acquired.

RADIATION DOSE: NA

LIMITATIONS: none



FINDINGS:  LUNGS AND PLEURA: No opacities, masses or pneumothorax. No pleural effusion.

MEDIASTINUM AND HILAR STRUCTURES: No masses or contour abnormalities.

HEART AND VASCULAR STRUCTURES: Heart normal size.  No evidence for failure.

BONES: No acute findings.

HARDWARE: None in the chest.

OTHER: No other significant finding.



IMPRESSION:  1. NO ACUTE RADIOGRAPHIC FINDING IN THE CHEST.



TECHNICAL DOCUMENTATION:  JOB ID:  [id number]

 2011 [email or web address] / [employer]- All Rights Reserved



Reading location - IP/workstation name: [email or web address]

## 2018-11-02 NOTE — ER DOCUMENT REPORT
ED General





- General


Chief Complaint: Sore Throat


Stated Complaint: SORE THROAT


Time Seen by Provider: 18 09:56


Mode of Arrival: Ambulatory


TRAVEL OUTSIDE OF THE [place] IN LAST 30 DAYS: No





- HPI


Patient complains to provider of: sore throat


Onset: Other - 38-year-old female that presents for evaluation of a sore throat 

over the last 2 days after having had a cough for approximately 2 months in the 

setting of being a chronic smoker that any other obvious health problems.  

Nothing is made it any better nothing makes it worse. She denies any chest pain

, palpitations, lightheadedness, diaphoresis, congestion, abdominal pain 

diarrhea constipation dysuria.  No rashes or other symptoms.  She has not tried 

anything to try and help this feel better other than some Tylenol which did 

seem to help a little bit.





- Related Data


Allergies/Adverse Reactions: 


 





prednisone Adverse Reaction (Verified 18 09:42)


 Shortness of Breath











Past Medical History





- General


Information source: Patient





- Social History


Smoking Status: Current Every Day Smoker


Chew tobacco use (# tins/day): No


Frequency of alcohol use: None


Drug Abuse: None


Family History: Reviewed & Not Pertinent


Patient has suicidal ideation: No


Patient has homicidal ideation: No





- Past Medical History


Cardiac Medical History: Reports: Hx Hypertension


Renal/ Medical History: Denies: Hx Peritoneal Dialysis


Psychiatric Medical History: Reports: Hx Depression


Past Surgical History: Reports: Hx  Section, Hx Cholecystectomy





Review of Systems





- Review of Systems


-: Yes All other systems reviewed and negative





Physical Exam





- Vital signs


Vitals: 


 











Temp Pulse Resp BP Pulse Ox


 


 98.4 F   91   16   156/121 H  97 


 


 18 09:44  18 09:44  18 09:44  18 09:44  18 09:44














- General


General appearance: Appears well


In distress: None





- HEENT


Head: Normocephalic


Eyes: Normal


Conjunctiva: Normal


Cornea: Normal


Extraocular movements intact: Yes


Eyelashes: Normal


Pupils: PERRL





- Respiratory


Respiratory status: No respiratory distress


Chest status: Nontender


Breath sounds: Normal


Chest palpation: Normal





- Cardiovascular


Rhythm: Regular


Heart sounds: Normal auscultation


Murmur: No





- Abdominal


Inspection: Normal


Distension: No distension


Tenderness: Nontender





- Back


Back: Normal





- Extremities


General upper extremity: Normal inspection, Nontender, Normal strength, Normal 

temperature





- Neurological


Neuro grossly intact: Yes


Cognition: Normal


Orientation: AAOx4


Rose Coma Scale Eye Opening: Spontaneous


Rose Coma Scale Verbal: Oriented


Ellis Coma Scale Motor: Obeys Commands


Rose Coma Scale Total: 15


Speech: Normal


Cranial nerves: Normal


Cerebellar coordination: Normal


Motor strength normal: LUE, RUE, LLE, RLE





- Psychological


Associated symptoms: Normal affect





Course





- Re-evaluation


Re-evalutation: 





18 17:47


This is a healthy smoker presents for sore throat.


She does have a cough which is chronic without any obvious symptoms to suggest 

a more serious underlying addition such as but not limited to Jose's angina, 

epiglottitis, meningitis, retropharyngeal abscess.


We will obtain an x-ray of the chest for her chronic cough and a strep swab.


Strep swab is negative, chest x-ray is unremarkable, there is no suggestion 

that this patient at this time has an obvious pulmonary nodule suggestive of 

some more serious underlying cause such as tuberculosis.


Because I do not believe that this patient has tuberculosis or some other more 

serious underlying pulmonary cause we will treat her symptomatically with 

naproxen and discharged home.


Current plan is for patient undergo discharge home with return precautions and 

encouraged follow-up with her primary physician.





- Vital Signs


Vital signs: 


 











Temp Pulse Resp BP Pulse Ox


 


 98.2 F   75   16   156/111 H  100 


 


 18 12:40  18 12:40  18 12:40  18 12:40  18 12:40














Discharge





- Discharge


Clinical Impression: 


 Sore throat, Cough





Condition: Good


Disposition: HOME, SELF-CARE


Instructions:  Sore Throat ([facility])


Additional Instructions: 


Your seen today in the emergency department for your sore throat and cough.


Stop smoking that will help with your cough.


Use the cough Perles as needed for your cough.


Return for worsening fevers or chills inability to eat or drink or throwing up.


Use liquid Benadryl to help with your sore throat as well as saline gargles and 

Motrin.


Schedule an appointment with your primary physician this week for your ongoing 

symptoms.


Prescriptions: 


Naproxen Sodium [Naproxen Sodium Ds] 550 mg PO BID PRN 15 Days #30 tablet


 PRN Reason: For Pain Scale 3-5


Forms:  Elevated Blood Pressure

## 2018-11-03 VITALS — DIASTOLIC BLOOD PRESSURE: 88 MMHG | SYSTOLIC BLOOD PRESSURE: 140 MMHG

## 2018-11-03 NOTE — ER DOCUMENT REPORT
HPI





- HPI


Pain Level: 5


Notes: 





Patient is a 38-year-old female with a history of hypertension who presents to 

the ED complaining of a continued sore throat since her visit this morning.  

She has had a sore throat for 2 days.  Patient states that she has pain with 

swallowing which has remained unchanged, and wants something to "fix it."  Her 

rapid strep was negative with a throat culture pending.  She is otherwise still 

able to eat and drink, but does have discomfort with swallowing.  She has not 

had any hoarseness or drooling.  She states that she has an allergy to 

steroids.  Denies any headache, fever, neck pain, URI, chest pain, palpitations

, syncope, shortness of breath, wheeze, dyspnea, abdominal pain, nausea/vomiting

/diarrhea, urinary retention, dysuria, hematuria, or rash.





- ROS


Systems Reviewed and Negative: Yes All other systems reviewed and negative





- CONSTITUTIONAL


Constitutional: REPORTS: Chills.  DENIES: Fever





- EENT


EENT: REPORTS: Sore Throat.  DENIES: Ear Pain, Eye problems





- NEURO


Neurology: DENIES: Headache





- CARDIOVASCULAR


Cardiovascular: DENIES: Chest pain





- RESPIRATORY


Respiratory: DENIES: Trouble Breathing, Coughing





- GASTROINTESTINAL


Gastrointestinal: DENIES: Abdominal Pain, Black / Bloody Stools





- URINARY


Urinary: DENIES: Dysuria, Urgency, Frequency





- MUSCULOSKELETAL


Musculoskeletal: DENIES: Extremity pain





Past Medical History





- Social History


Smoking Status: Current Every Day Smoker


Chew tobacco use (# tins/day): No


Frequency of alcohol use: None


Drug Abuse: None


Family History: Reviewed & Not Pertinent


Patient has suicidal ideation: No


Patient has homicidal ideation: No





- Past Medical History


Cardiac Medical History: Reports: Hx Hypertension


Renal/ Medical History: Denies: Hx Peritoneal Dialysis


Psychiatric Medical History: Reports: Hx Depression


Past Surgical History: Reports: Hx  Section, Hx Cholecystectomy





Vertical Provider Document





- CONSTITUTIONAL


Agree With Documented VS: Yes


Notes: 





PHYSICAL EXAMINATION:





GENERAL: Well-appearing, well-nourished and in no acute distress.  A&Ox4.  

Answers questions appropriately.  Moves comfortably w/o notable distress





HEAD: Atraumatic, normocephalic.





EYES: Pupils equal round and reactive to light, extraocular movements intact, 

sclera anicteric, conjunctiva are normal.





ENT: EAC clear b/l.  TM's intact b/l without erythema, fluid, or perforation.  

Nares patent and without discharge.  oropharynx erythematous.  2+ tonsilar 

hypertrophy b/l with erythema and b/l exudates.  No palatine shift.  Uvula 

midline.  No tongue protrusion.  No drooling, hoarseness, or airway compromise.

  Moist mucous membranes.  No sinus tenderness.  No muffled voice.





NECK: Normal range of motion, supple without lymphadenopathy.  No rigidity/

meningismus.





LUNGS: Breath sounds clear to auscultation bilaterally and equal.  No wheezes 

rales or rhonchi.  No retractions





HEART: Regular rate and rhythm without murmurs, rubs, gallops.





ABDOMEN: Soft, nontender, nondistended abdomen.  No guarding, no rebound.  No 

masses appreciated.  Normal bowel sounds present.  No CVA tenderness 

bilaterally.  No obvious hepatosplenomegaly, but pt is obese and difficult to 

assess fully.





NEUROLOGICAL: Normal speech, normal gait.  





PSYCH: Normal mood, normal affect.  





SKIN: Warm, Dry, normal turgor, no rashes or lesions noted.





- INFECTION CONTROL


TRAVEL OUTSIDE OF THE [place] IN LAST 30 DAYS: No





Course





- Re-evaluation


Re-evalutation: 





18 00:23


Patient is an afebrile, well-hydrated, 38-year-old female who presents to the 

ED with acute pharyngitis, suspect viral.  Vitals are acceptable without 

significant tachycardia, tachypnea, or hypoxia.  Patient did not take her blood 

pressure medication this evening so her lisinopril was given to her p.o.  

Patient has an allergy to steroids which causes shortness of breath and does 

not want any Decadron because of this.  Toradol given IM today.  Viscous 

lidocaine also dispensed for her today.  Patient is nontoxic-appearing and is 

tolerating p.o. without difficulties.  She has not had any physical exam 

findings consistent for concerning peritonsillar/pharyngeal abscess.  Low 

suspicion for any meningitis, sepsis, peritonsillar/pharyngeal abscess, airway 

compromise, Jose's, or other emergent systemic condition at this time.  

Patient is aware this condition can change from initial presentation and she 

needs to monitor symptoms closely.  I will send her home with a Magic mouthwash 

prescription.  Conservative measures otherwise for symptoms.  Recheck with your 

PCM in 2-3 days.  Return to the ED with any worsening/concerning symptoms 

otherwise as reviewed in discharge.  Patient is in agreement.





- Vital Signs


Vital signs: 


 











Temp Pulse Resp BP Pulse Ox


 


 99.1 F   98   14   171/114 H  100 


 


 18 23:11  18 23:11  18 23:11  18 23:11  18 23:11














Discharge





- Discharge


Clinical Impression: 


Acute pharyngitis, unspecified


Qualifiers:


 Pharyngitis/tonsillitis etiology: unspecified etiology Qualified Code(s): 

J02.9 - Acute pharyngitis, unspecified





Condition: Stable


Disposition: HOME, SELF-CARE


Instructions:  Sore Throat ([facility])


Additional Instructions: 


Maintain adequate fluid intake


Take meds as directed


Salt water gargles, throat sprays, mouthwash rinse, peroxide gargles


tylenol/ibuprofen as needed


over the counter cold medication as needed for symptoms


F/u:  with your PCM in 2-3 days for a recheck


Consider consult with ENT for ongoing/worsening symptoms


Return to the ED with any fever, worsening pain, chest pain, neck pain/stiffness

, shortness of breath, cough, drooling, hoarseness, trouble swallowing/breathing

, abdominal pain, n/v/d, rash, or worsening/concerning symptoms otherwise.


Prescriptions: 


Nystatin/Dexameth/Diphen [Magic Mouthwash ([facility] Formula) Susp] 5 ml PO QID #120 

ml


Forms:  Elevated Blood Pressure, Smoking Cessation Education


Referrals: 


ALMA GUERRERO DO [ASSOCIATE] - 18

## 2019-02-24 ENCOUNTER — HOSPITAL ENCOUNTER (EMERGENCY)
Dept: HOSPITAL 62 - ER | Age: 39
LOS: 1 days | Discharge: HOME | End: 2019-02-25
Payer: MEDICAID

## 2019-02-24 DIAGNOSIS — F41.9: ICD-10-CM

## 2019-02-24 DIAGNOSIS — I10: ICD-10-CM

## 2019-02-24 DIAGNOSIS — R51: Primary | ICD-10-CM

## 2019-02-24 DIAGNOSIS — R11.0: ICD-10-CM

## 2019-02-24 DIAGNOSIS — H53.149: ICD-10-CM

## 2019-02-24 PROCEDURE — 96375 TX/PRO/DX INJ NEW DRUG ADDON: CPT

## 2019-02-24 PROCEDURE — 99283 EMERGENCY DEPT VISIT LOW MDM: CPT

## 2019-02-24 PROCEDURE — 96374 THER/PROPH/DIAG INJ IV PUSH: CPT

## 2019-02-25 VITALS — DIASTOLIC BLOOD PRESSURE: 93 MMHG | SYSTOLIC BLOOD PRESSURE: 148 MMHG

## 2019-02-25 NOTE — ER DOCUMENT REPORT
ED Headache





- General


Chief Complaint: Headache


Stated Complaint: HEADACHE


Time Seen by Provider: 19 00:30


Notes: 





Patient is a 38-year-old female that comes to the emergency department chief 

complaint of a headache.  She states headache started earlier in the day, 

started getting worse, she started taking Tylenol and over-the-counter 

medication for it, she states it got worse again and she started having nausea 

and a throbbing sensation over the front of her head.  She states she has had 

similar headaches in the past, she states she has had imaging of her head in the

past as well.  She does not take any daily medications for migraines.  She is 

not on a blood thinner.  She denies fever, neck pain, head injury.


TRAVEL OUTSIDE OF THE [place] IN LAST 30 DAYS: No





- Related Data


Allergies/Adverse Reactions: 


                                        





prednisone Adverse Reaction (Verified 18 09:42)


   Shortness of Breath











Past Medical History





- General


Information source: Patient





- Social History


Smoking Status: Never Smoker


Frequency of alcohol use: None


Drug Abuse: None


Lives with: Family


Family History: Reviewed & Not Pertinent


Patient has suicidal ideation: No


Patient has homicidal ideation: No





- Past Medical History


Cardiac Medical History: Reports: Hx Hypertension


Renal/ Medical History: Denies: Hx Peritoneal Dialysis


Psychiatric Medical History: Reports: Hx Depression


Past Surgical History: Reports: Hx  Section, Hx Cholecystectomy





- Immunizations


Immunizations up to date: Yes


Hx Diphtheria, Pertussis, Tetanus Vaccination: Yes





Review of Systems





- Review of Systems


Constitutional: No symptoms reported


EENT: No symptoms reported


Cardiovascular: No symptoms reported


Respiratory: No symptoms reported


Gastrointestinal: See HPI


Genitourinary: No symptoms reported


Female Genitourinary: No symptoms reported


Musculoskeletal: No symptoms reported


Skin: No symptoms reported


Hematologic/Lymphatic: No symptoms reported


Neurological/Psychological: See HPI





Physical Exam





- Vital signs


Vitals: 


                                        











Temp Pulse Resp BP Pulse Ox


 


 97.8 F   96   17   159/114 H  100 


 


 19 01:39  19 01:39  19 01:39  19 01:39  19 01:39














- Notes


Notes: 





GENERAL: Alert, interacts well. No acute distress.


HEAD: Normocephalic, atraumatic.


EYES: Pupils equal, round, and reactive to light. Extraocular movements intact.


ENT: Oral mucosa moist, tongue midline. Oropharynx unremarkable. Airway patent. 

Nares patent, no nasal septal hematoma, TM's intact.


NECK: Full range of motion. Supple. Trachea midline.


LUNGS: Clear to auscultation bilaterally, no wheezes, rales, or rhonchi. No 

respiratory distress.


HEART: Regular rate and rhythm. No murmur


ABDOMEN: Soft, non-tender. Non-distended. Bowel sounds present in all 4 q

uadrants.


GENITOURINARY: Deferred


EXTREMITIES: Moves all 4 extremities spontaneously. No edema, normal radial and 

dorsalis pedis pulses bilaterally. No cyanosis.


BACK: no cervical, thoracic, lumbar midline tenderness. No saddle anesthesia, 

normal distal neurovascular exam. 


NEUROLOGICAL: Alert and oriented x3. Normal speech. [cranial nerves II through 

XII grossly intact]. 


PSYCH: Normal affect, normal mood.


SKIN: Warm, dry, normal turgor. No rashes or lesions noted.





Course





- Re-evaluation


Re-evalutation: 


Patient is smiling and very well-appearing.  She reports gradually worsening 

symptoms, she does describe migraine-like symptoms with throbbing pain behind 

one eye with nausea and photophobia.  Patient given Reglan, will reevaluate.





On reevaluation patient is very anxious, she states she feels extremely restless

and like she cannot hold still, however she states her headache is completely 

gone.  I suspect she is having side effects of the Reglan.  She was given 

Benadryl.  Will reevaluate.





After evaluation, symptoms of restlessness and anxiety have completely resolved.

 Headache is completely gone as well per patient.  She is requesting to leave.  

I have low suspicion of emergent etiology based on patient's benign appearance, 

symptom resolution, and characterization of the headache.  Provided with 

Fioricet for home, discussed primary care follow-up and return precautions.  She

states understanding and agreement.





- Vital Signs


Vital signs: 


                                        











Temp Pulse Resp BP Pulse Ox


 


 97.8 F   73   16   148/93 H  100 


 


 19 02:46  19 02:46  19 02:46  19 02:46  19 02:46














Discharge





- Discharge


Clinical Impression: 


Headache


Qualifiers:


 Headache type: unspecified Headache chronicity pattern: acute headache 

Intractability: not intractable Qualified Code(s): R51 - Headache





Condition: Stable


Disposition: HOME, SELF-CARE


Additional Instructions: 


Your evaluation, symptoms, and resolution with treatment are very suggestive of 

a migraine.


You can take the prescribed medication if needed for headaches in the future.


Follow-up with primary care for additional evaluation and management of 

migraines.


Return if you worsen including returned or severe headache, vomiting, fever, or 

any other concerning or worsening symptoms.


Prescriptions: 


Butalb/Acetaminophen/Caffeine [Fioricet (-40 mg) Tablet] 1 tab PO Q4HP PRN

#20 tab


 PRN Reason: 


Forms:  Elevated Blood Pressure

## 2019-02-27 ENCOUNTER — HOSPITAL ENCOUNTER (EMERGENCY)
Dept: HOSPITAL 62 - ER | Age: 39
Discharge: HOME | End: 2019-02-27
Payer: MEDICAID

## 2019-02-27 VITALS — DIASTOLIC BLOOD PRESSURE: 110 MMHG | SYSTOLIC BLOOD PRESSURE: 153 MMHG

## 2019-02-27 DIAGNOSIS — K12.2: ICD-10-CM

## 2019-02-27 DIAGNOSIS — K08.89: ICD-10-CM

## 2019-02-27 DIAGNOSIS — K02.9: ICD-10-CM

## 2019-02-27 DIAGNOSIS — F17.200: ICD-10-CM

## 2019-02-27 DIAGNOSIS — I10: ICD-10-CM

## 2019-02-27 DIAGNOSIS — K04.7: Primary | ICD-10-CM

## 2019-02-27 DIAGNOSIS — K05.10: ICD-10-CM

## 2019-02-27 PROCEDURE — 40800 DRAINAGE OF MOUTH LESION: CPT

## 2019-02-27 PROCEDURE — 99283 EMERGENCY DEPT VISIT LOW MDM: CPT

## 2019-02-27 NOTE — ER DOCUMENT REPORT
HPI





- HPI


Pain Level: 4


Notes: 





Patient is a 38-year-old female who presents to the ED complaining of Rt upper 

dental pain #5 x3-4 days with possible abscess x2 days.  Patient states that she

does have swelling to the roof of mouth and had purulent discharge expressed 

earlier today.  Patient states that she is still able to eat and drink, but does

have a decreased p.o. intake due to the pain.  She has tried some 

over-the-counter meds with minimal relief.  No other concerns or complaints.  D

enies any headache, fever, head injury, neck pain, hoarseness, drooling, URI, 

sore throat, chest pain, palpitations, syncope, cough, shortness of breath, 

wheeze, dyspnea, abdominal pain, nausea/vomiting/diarrhea, urinary retention, 

dysuria, hematuria, or rash.





- ROS


Systems Reviewed and Negative: Yes All other systems reviewed and negative





- REPRODUCTIVE


Reproductive: DENIES: Pregnant:





Past Medical History





- Social History


Smoking Status: Current Every Day Smoker


Family History: Reviewed & Not Pertinent


Patient has suicidal ideation: No


Patient has homicidal ideation: No





- Past Medical History


Cardiac Medical History: Reports: Hx Hypertension


Renal/ Medical History: Denies: Hx Peritoneal Dialysis


Psychiatric Medical History: Reports: Hx Depression


Past Surgical History: Reports: Hx  Section, Hx Cholecystectomy





- Immunizations


Immunizations up to date: Yes


Hx Diphtheria, Pertussis, Tetanus Vaccination: Yes





Vertical Provider Document





- CONSTITUTIONAL


Agree With Documented VS: Yes


Notes: 





PHYSICAL EXAMINATION:





GENERAL: Well-appearing, well-nourished and in no acute distress.





HEAD: Atraumatic, normocephalic.





EYES: Pupils equal round and reactive to light, extraocular movements intact, 

sclera anicteric, conjunctiva are normal.





ENT: EAC clear b/l.  TM's intact b/l without erythema, fluid, or perforation.  

Nares patent and without discharge.  oropharynx clear without exudates.  No 

tonsilar hypertrophy or erythema.  Moist mucous membranes.  No sinus tenderness.

 Uvula midline.  No palatine shift.  No tongue protrusion.  No respiratory 

compromise.





Mouth:  Poor dentition.  + mild decay and mild gingivitis.  + small fluctuant 

abscess to the rt roof of mouth near #5.  No facial swelling.  + tenderness to 

tooth #5.





NECK: Normal range of motion, supple without lymphadenopathy.  No 

rigidity/meningismus.





LUNGS: Breath sounds clear to auscultation bilaterally and equal.  No wheezes 

rales or rhonchi.





HEART: Regular rate and rhythm without murmurs, rubs, gallops.





NEUROLOGICAL: Cranial nerves grossly intact.  Normal speech, normal gait.  





PSYCH: Normal mood, normal affect.





SKIN: Warm, Dry, normal turgor, no rashes or lesions noted.





- INFECTION CONTROL


TRAVEL OUTSIDE OF THE [place] IN LAST 30 DAYS: No





Course





- Re-evaluation


Re-evalutation: 





19 12:07


Patient is an afebrile, well-hydrated, 38-year-old female who presents to the ED

with dental pain and infection.  Vitals are acceptable.  PE is otherwise 

unremarkable.  No labs or imaging warranted at this time based on H&P.  I&D 

performed successfully w/o complications.  Viscous lidocaine dispensed today.  I

will send her home with a prescription for cleocin.  Low suspicion for any 

meningitis, sepsis, peritonsillar/pharyngeal abscess, respiratory compromise, 

Jose's, temporal arteritis, or other emergent systemic condition at this time.

 Patient is aware this condition can change from initial presentation and she 

needs to monitor symptoms closely.  Conservative measures otherwise for 

symptoms.  Call to schedule an appointment with a dentist for further evaluation

and management.  Recheck with your PCM this week as well.  Return to the ED with

any worsening/concerning symptoms otherwise as reviewed in discharge.  Patient 

is in agreement.





- Vital Signs


Vital signs: 


                                        











Temp Pulse Resp BP Pulse Ox


 


 98.5 F   93   16   153/110 H  97 


 


 19 11:52  19 11:52  19 11:52  19 11:52  19 11:52














Procedures





- Incision and Drainage


  ** Mouth


Time completed: 12:05


Type: Simple


Anesthetic type: Other - topical lidocaine


Blade size: 11


Incision Method: Incision made by scalpel


Amount/type of drainage: mostly blood, scant to no purulence





Discharge





- Discharge


Clinical Impression: 


 Dental abscess





Condition: Stable


Disposition: HOME, SELF-CARE


Instructions:  Clindamycin ([facility]), Toothache ([facility])


Additional Instructions: 


Brush and floss twice daily


Maintain fluid intake


Take antibiotics as directed


Mouthwash, salt water gargles/rinses


Tylenol/ibuprofen as needed


Recheck with PCM this week


Call today/tomorrow and schedule an appointment with your dentist for further 

evaluation


Return to the ED with any worsening symptoms and/or development of fever, 

headache, facial swelling, swelling of lips/tongue/throat, trouble swallowing, 

drooling, hoarseness, neck pain/stiffness, chest pain, palpitations, syncope, 

shortness of breath, trouble breathing, abdominal pain, n/v/d, 

numbness/tingling, or other worsening symptoms that are concerning to you.


Prescriptions: 


Clindamycin HCl [Cleocin 300 mg Capsule] 300 mg PO TID #30 capsule


Forms:  Elevated Blood Pressure, Smoking Cessation Education


Referrals: 


[facility] [Provider Group] - Follow up as needed


ROSALIE ALEXANDER MD [ACTIVE STAFF] - Follow up as needed

## 2019-08-03 NOTE — ER DOCUMENT REPORT
ED General





- General


Chief Complaint: Dizziness


Stated Complaint: DIZZINESS


Time Seen by Provider: 19 04:40


Notes: 





Patient is a pleasant 38-year-old female presents with complaints of dizziness 

and rapid heartbeat.  She says the last 2 days she has had gradually worsening 

periods of says it may be panic attacks also increased anxiety and fullness of 

her heart is beating fast and she becomes dizzy.  No chest pain.  No fevers.  No

recent infections.  She did run out of her Klonopin 2 days ago.  She takes 0.5 

mg twice a day.  She says she is noticed that since she ran out of medication 

her symptoms have started.  No other complaints at this time.  Her follow-up 

appointment with her doctor to get a new prescription is on .


TRAVEL OUTSIDE OF THE [place] IN LAST 30 DAYS: No





- Related Data


Allergies/Adverse Reactions: 


                                        





prednisone Adverse Reaction (Verified 19 11:35)


   Shortness of Breath











Past Medical History





- Social History


Smoking Status: Current Every Day Smoker


Frequency of alcohol use: None


Drug Abuse: None


Family History: Reviewed & Not Pertinent


Patient has suicidal ideation: No


Patient has homicidal ideation: No





- Past Medical History


Cardiac Medical History: Reports: Hx Hypertension


Renal/ Medical History: Denies: Hx Peritoneal Dialysis


Psychiatric Medical History: Reports: Hx Depression


Past Surgical History: Reports: Hx  Section, Hx Cholecystectomy





- Immunizations


Immunizations up to date: Yes


Hx Diphtheria, Pertussis, Tetanus Vaccination: Yes





Review of Systems





- Review of Systems


Notes: 





My Normal Review Basic





REVIEW OF SYSTEMS:


CONSTITUTIONAL :  Denies fever,  chills, or sweats.  Denies recent illness.


EENT:   Denies eye, ear, throat, or mouth pain or symptoms.  Denies nasal or 

sinus congestion.


CARDIOVASCULAR: Palpitations


RESPIRATORY:  Denies cough, cold, or chest congestion.  Denies shortness of 

breath, difficulty breathing, or wheezing.


GASTROINTESTINAL:  Denies abdominal pain.  Denies nausea, vomiting, or diarrhea.




MUSCULOSKELETAL:  Denies neck or back pain or joint pain or swelling.


SKIN:   Denies rash or skin lesions.


NEUROLOGICAL:  Denies altered mental status or loss of consciousness.  Denies 

headache.  Denies weakness or paralysis or loss of use of either side.  Denies 

problems with gait or speech.  Denies sensory or motor loss.


ALL OTHER SYSTEMS REVIEWED AND NEGATIVE.





Physical Exam





- Vital signs


Vitals: 


                                        











Temp Pulse Resp BP Pulse Ox


 


 97.8 F   150 H  20   144/90 H  100 


 


 19 03:18  19 03:18  19 03:18  19 03:18  19 03:18














- Notes


Notes: 





General Appearance: Well nourished, alert, cooperative, no acute distress, no 

obvious discomfort.  Well-appearing


Vitals: reviewed, See vital signs table.


Eyes: PERRL, EOMI, Conjuctiva clear


Mouth: No decreasd moisture


Throat: No tonsillar inflammation, No airway obstruction,  No lymphadenopathy


Neck: Supple, no neck tenderness, No thyromegaly


Lungs: No wheezing, No rales, No rhonci, No accessory muscle use, good air 

exchange bilaterally.


Heart: Tachycardic rate, Regular rythm, No murmur, no rub


Abdomen: Normal BS, soft, No rigidity, No abdominal tenderness, No guarding, no 

rebound, no abdominal masses, no organomegaly


Extremities: strength 5/5 in all extremities, good pulses in all extremities, no

swelling or tenderness in the extremities, no edema.


Skin: warm, dry, appropriate color, no rash


Neuro: speech clear, oriented x 3, normal affect, responds appropriately to 

questions.





Course





- Re-evaluation


Re-evalutation: 





19 06:14


Patient's heart rate quickly improved after receiving the Klonopin.  Heart rate 

is now in the upper 70s low 80s.  Heart rate is 140 when she first arrived.  

After she rested and relaxed some her heart rate went down to the 100s and then 

after the Klonopin her heart rate normalized.  She looks well.  She has no 

further concerns at this time. she has no chest pain.  She has no shortness of 

breath.  I feel she safe to be discharged home.  However prescription for 

Klonopin to get her through until her next appointment in 3 days.  Encourage 

return to ER if she has further concerns or feels unwell.  I did look up on the 

prescription database and she did run out of the Klonopin at the appropriate 

time based on her most recent prescription.





Dictation of this chart was performed using voice recognition software; 

therefore, there may be some unintended grammatical errors.





- Vital Signs


Vital signs: 


                                        











Temp Pulse Resp BP Pulse Ox


 


 97.8 F   150 H  15   125/86 H  96 


 


 19 03:18  08/19 03:18  19 05:01  19 05:01  19 05:01














- Laboratory


Result Diagrams: 


                                 19 04:40





                                 19 04:40


Laboratory results interpreted by me: 


                                        











  19





  04:40


 


Potassium  3.3 L














- EKG Interpretation by Me


Additional EKG results interpreted by me: 





19 04:41


EKG is reviewed and interpreted by me.  EKG shows sinus tachycardia with a rate 

of 139 bpm.  No ST segment elevation or depression.  NH interval, QRS duration, 

QT intervals are within normal range.  Old EKG for comparison is from 2018.





Discharge





- Discharge


Clinical Impression: 


 Dizziness, Tachycardia





Benzodiazepine withdrawal


Qualifiers:


 Complication of substance-induced condition: with unspecified complication 

Qualified Code(s): F13.239 - Sedative, hypnotic or anxiolytic dependence with 

withdrawal, unspecified





Condition: Good


Disposition: HOME, SELF-CARE


Additional Instructions: 


Please take your Klonopin as prescribed.  Please follow-up with your doctor on 

the sixth at your upcoming appointment to continue prescribing your Klonopin.  I

have written a prescription to get you through until then.  Please return to ER 

if you have recurrence of your symptoms, heartbeat, chest pain, difficulty 

breathing, vomiting, seizure, or feel unwell.


Prescriptions: 


Clonazepam [Klonopin] 0.5 mg PO BID #10 tablet

## 2019-08-03 NOTE — EKG REPORT
SEVERITY:- BORDERLINE ECG -

SINUS TACHYCARDIA

BORDERLINE T ABNORMALITIES, DIFFUSE LEADS

:

Confirmed by: Jessica Gifford MD 03-Aug-2019 11:11:56

## 2020-10-21 NOTE — ER DOCUMENT REPORT
ED Medical Screen (RME)





- General


Chief Complaint: Cough


Stated Complaint: COUGH,CONGESTION


Time Seen by Provider: 10/21/20 17:52


Primary Care Provider: 


LYDIA MARCELO MD [Primary Care Provider] - Follow up as needed


Information source: Patient


Notes: 





Patient presents complaining of cough and congestion for the past week.  Patient

reports occasional lightheadedness.  Patient denies any fever or chest pain.  

Patient denies any nausea vomiting or diarrhea.  Patient does have a history of 

hypertension and is 1/2 pack/day smoker.  Patient denies any history of COPD.





I have greeted and performed a rapid initial assessment of this patient.  A 

comprehensive ED assessment and evaluation of the patient, analysis of test 

results and completion of the medical decision making process will be conducted 

by additional ED providers.


TRAVEL OUTSIDE OF THE [place] IN LAST 30 DAYS: No





- Related Data


Allergies/Adverse Reactions: 


                                        





prednisone Adverse Reaction (Verified 19 11:35)


   Shortness of Breath











Past Medical History





- Past Medical History


Cardiac Medical History: Reports: Hx Hypertension


Renal/ Medical History: Denies: Hx Peritoneal Dialysis


Psychiatric Medical History: Reports: Hx Depression


Past Surgical History: Reports: Hx  Section, Hx Cholecystectomy





- Immunizations


Immunizations up to date: Yes


Hx Diphtheria, Pertussis, Tetanus Vaccination: Yes





Physical Exam





- Vital signs


Vitals: 





                                        











Temp Pulse Resp BP Pulse Ox


 


 98.2 F   95   16   142/94 H  97 


 


 10/21/20 17:17  10/21/20 17:17  10/21/20 17:17  10/21/20 17:17  10/21/20 17:17














- Respiratory


Respiratory status: No respiratory distress


Breath sounds: Nonproductive cough, Wheezing





Course





- Vital Signs


Vital signs: 





                                        











Temp Pulse Resp BP Pulse Ox


 


 98.2 F   95   16   142/94 H  97 


 


 10/21/20 17:17  10/21/20 17:17  10/21/20 17:17  10/21/20 17:17  10/21/20 17:17














Doctor's Discharge





- Discharge


Referrals: 


LYDIA MARCELO MD [Primary Care Provider] - Follow up as needed

## 2020-10-21 NOTE — RADIOLOGY REPORT (SQ)
EXAM DESCRIPTION:  CHEST SINGLE VIEW



IMAGES COMPLETED DATE/TIME:  10/21/2020 6:30 pm



REASON FOR STUDY:  cough, sob



COMPARISON:  11/2/2018



EXAM PARAMETERS:  NUMBER OF VIEWS: One view.

TECHNIQUE: Single frontal radiographic view of the chest acquired.

RADIATION DOSE: NA

LIMITATIONS: None.



FINDINGS:  LUNGS AND PLEURA: No opacities, masses or pneumothorax. No pleural effusion.

MEDIASTINUM AND HILAR STRUCTURES: No masses.  Contour normal.

HEART AND VASCULAR STRUCTURES: Heart normal in size.  Normal vasculature.

BONES: No acute findings.

HARDWARE: None in the chest.

OTHER: No other significant finding.



IMPRESSION:  NO ACUTE RADIOGRAPHIC FINDING IN THE CHEST.



TECHNICAL DOCUMENTATION:  JOB ID:  [id number]

 2011 [employer]- All Rights Reserved



Reading location - IP/workstation name: [email or web address]

## 2020-10-21 NOTE — ER DOCUMENT REPORT
ED General





- General


Chief Complaint: Cough


Stated Complaint: COUGH,CONGESTION


Time Seen by Provider: 10/21/20 17:52


Primary Care Provider: 


LYDIA MARCELO MD [Primary Care Provider] - Follow up as needed


Mode of Arrival: Ambulatory


Information source: Patient


Notes: 





40-year-old obese -American female who smokes here with cough and 

congestion for the past week.  No fevers or shaking chills.  No nausea or 

vomiting.  No loss of smell or taste.  No sick contacts.  States she gets 

bronchitis a couple times a year.  Does not have asthma or COPD at baseline.


TRAVEL OUTSIDE OF THE [place] IN LAST 30 DAYS: No





- Related Data


Allergies/Adverse Reactions: 


                                        





prednisone Adverse Reaction (Verified 19 11:35)


   Shortness of Breath











Past Medical History





- General


Information source: Patient





- Social History


Smoking Status: Current Every Day Smoker


Family History: Reviewed & Not Pertinent





- Past Medical History


Cardiac Medical History: Reports: Hx Hypertension


Renal/ Medical History: Denies: Hx Peritoneal Dialysis


Psychiatric Medical History: Reports: Hx Depression


Past Surgical History: Reports: Hx  Section, Hx Cholecystectomy





- Immunizations


Immunizations up to date: Yes


Hx Diphtheria, Pertussis, Tetanus Vaccination: Yes





Review of Systems





- Review of Systems


Notes: 





Constitutional:  No fevers. No chills.





EENT: No eye redness. No eye pain. No ear pain. No sore throat.





Cardiovascular:  No chest pain. No palpitations.





Respiratory: + cough and congestion





Gastrointestinal: No abdominal pain. No nausea, vomiting, or diarrhea.





Genitourinary: Atraumatic. No lesions. No pain. No discharge.





Musculoskeletal: Atraumatic. No swelling. No deformities.





Skin: No rash or lesions.





Lymphatic: No swollen lymph nodes.





Neurologic: No headache. No syncope.





Psychiatric: No suicidal or homicidal ideation.





Physical Exam





- Vital signs


Vitals: 


                                        











Temp Pulse Resp BP Pulse Ox


 


 98.2 F   95   16   142/94 H  97 


 


 10/21/20 17:17  10/21/20 17:17  10/21/20 17:17  10/21/20 17:17  10/21/20 17:17














- Notes


Notes: 





General: Well-developed, well-nourished. In no acute distress. Non-toxic 

appearing.





Cardiac: Well-perfused. Regular rate and rhythm. No murmurs, rubs, or gallops. 





Pulmonary: No respiratory distress. No cyanosis. Bilateral lung fields with 

rhonchi and slight wheezing





Abdominal: Non-distended. Non-rigid. Bowels sounds are present in all four 

quadrants. No guarding or rebound.





HEENT: Head is atraumatic. Conjunctivae not reddened. No tearing. PERRL. EOMI. 

Orbits atraumatic. No periorbital swelling or erythema. Oropharynx is without 

erythema, swelling, or exudates.





Neck: Supple. No adenopathy. No meningismus.





Dermatologic: Warm with good turgor. No rash. Atraumatic.





Chest: Atraumatic. No chest wall tenderness to palpation.





Musculoskeletal: Moves all extremities well. No range of motion deficits. no 

muscular or joint tenderness. No paraspinal muscle tenderness. no midline spinal

tenderness or step-off.





Genitourinary: Examination deferred





Neurologic: No gross neurologic deficits.





Psychiatric: Normal mood. 











Course





- Re-evaluation


Re-evalutation: 





10/21/20 19:46


Patient has a normal chest x-ray.  She has rhonchi and wheezing on auscultation.

 Suspect bronchitis.  Patient states she cannot take steroids.


10/21/20 19:46





10/21/20 19:49


Patient is feeling better after nebulizer treatment.  She is opposed to taking 

any steroids orally or parenterally because of previous reaction.  I encouraged 

her to quit smoking.  We will put her on metered dose inhaler 2 puffs every 4 

hours as needed and will discharge





- Vital Signs


Vital signs: 


                                        











Temp Pulse Resp BP Pulse Ox


 


 98.2 F   95   16   142/94 H  97 


 


 10/21/20 17:17  10/21/20 17:17  10/21/20 17:17  10/21/20 17:17  10/21/20 17:17














- Laboratory


Result Diagrams: 


                                 10/21/20 19:00





                                 10/21/20 19:00


Laboratory results interpreted by me: 


                                        











  10/21/20 10/21/20





  19:00 19:00


 


Hct  35.2 L 


 


RDW  14.3 H 


 


Est GFR (MDRD) Non-Af   54 L


 


Alkaline Phosphatase   140 H














- Diagnostic Test


Radiology reviewed: Reports reviewed





Discharge





- Discharge


Clinical Impression: 


 Bronchitis, Elevated blood pressure reading, Tobacco abuse





Condition: Good


Disposition: HOME, SELF-CARE


Instructions:  Bronchitis (Formerly [facility], later [facility])


Additional Instructions: 


Please quit smoking.  This will help with your breathing.  Albuterol inhaler 2 

puffs every 4 hours as needed for shortness of breath or cough.  You may ask the

pharmacist at your pharmacy of choice if they have a recommendation for a cough 

syrup if needed.


Prescriptions: 


Albuterol Sulfate [Proair HFA Inhalation Aerosol 8.5 gm MDI] 2 puff IH Q4H PRN 

#1 mdi


 PRN Reason: 


Forms:  Smoking Cessation Education


Referrals: 


LYDIA MARCELO MD [Primary Care Provider] - Follow up as needed